# Patient Record
Sex: FEMALE | Race: WHITE | NOT HISPANIC OR LATINO | URBAN - NONMETROPOLITAN AREA
[De-identification: names, ages, dates, MRNs, and addresses within clinical notes are randomized per-mention and may not be internally consistent; named-entity substitution may affect disease eponyms.]

---

## 2021-09-14 ENCOUNTER — PREPPED CHART (OUTPATIENT)
Dept: URBAN - NONMETROPOLITAN AREA CLINIC 1 | Facility: CLINIC | Age: 76
End: 2021-09-14

## 2021-09-14 ENCOUNTER — IMPORTED ENCOUNTER (OUTPATIENT)
Dept: URBAN - NONMETROPOLITAN AREA CLINIC 1 | Facility: CLINIC | Age: 76
End: 2021-09-14

## 2021-09-14 PROBLEM — H35.3131: Noted: 2021-09-14

## 2021-09-14 PROBLEM — H43.812: Noted: 2021-09-14

## 2021-09-14 PROBLEM — H26.493: Noted: 2021-09-14

## 2021-09-14 PROBLEM — Z96.1: Noted: 2021-09-14

## 2021-09-14 PROCEDURE — 92250 FUNDUS PHOTOGRAPHY W/I&R: CPT

## 2021-09-14 PROCEDURE — 99203 OFFICE O/P NEW LOW 30 MIN: CPT

## 2021-09-14 NOTE — PATIENT DISCUSSION
Floaters OS-  Discussed  signs/symptoms of RD or tear. -  Discussed in detail the nature of flashes/floaters and to call if there is a sudden increase in their number. - Optos done today OD stable and OS shows floaters - Conitnue to monitpr ARMD  OU- Discussed diagnosis in detail with patient- Recommend  patient start eye vitamins daily such as Preservision patient already taking - Recommend that patient start following the [id number] patient to call or come into the office ASAP if any changes noted from today.  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

## 2022-03-11 ASSESSMENT — TONOMETRY
OS_IOP_MMHG: 08
OD_IOP_MMHG: 10

## 2022-04-09 ASSESSMENT — TONOMETRY
OD_IOP_MMHG: 10
OS_IOP_MMHG: 08

## 2022-04-09 ASSESSMENT — VISUAL ACUITY
OD_CC: 20/25-2
OS_CC: 20/30-1